# Patient Record
Sex: FEMALE | URBAN - METROPOLITAN AREA
[De-identification: names, ages, dates, MRNs, and addresses within clinical notes are randomized per-mention and may not be internally consistent; named-entity substitution may affect disease eponyms.]

---

## 2017-02-14 ENCOUNTER — OFFICE VISIT (OUTPATIENT)
Dept: INTERNAL MEDICINE CLINIC | Age: 37
End: 2017-02-14

## 2017-02-14 VITALS
BODY MASS INDEX: 20.98 KG/M2 | OXYGEN SATURATION: 98 % | TEMPERATURE: 99.1 F | DIASTOLIC BLOOD PRESSURE: 78 MMHG | WEIGHT: 138.4 LBS | HEIGHT: 68 IN | HEART RATE: 75 BPM | SYSTOLIC BLOOD PRESSURE: 108 MMHG | RESPIRATION RATE: 20 BRPM

## 2017-02-14 DIAGNOSIS — Z76.89 ENCOUNTER TO ESTABLISH CARE: Primary | ICD-10-CM

## 2017-02-14 DIAGNOSIS — F41.9 ANXIETY: ICD-10-CM

## 2017-02-14 RX ORDER — DUREZOL 0.5 MG/ML
EMULSION OPHTHALMIC
Refills: 3 | COMMUNITY
Start: 2017-01-27

## 2017-02-14 RX ORDER — TIMOLOL MALEATE 5 MG/ML
SOLUTION/ DROPS OPHTHALMIC
Refills: 0 | COMMUNITY
Start: 2017-01-09

## 2017-02-14 NOTE — PROGRESS NOTES
Chief Complaint   Patient presents with   [facility].     pateint is here to establish

## 2017-02-14 NOTE — PATIENT INSTRUCTIONS

## 2017-02-14 NOTE — PROGRESS NOTES
They are here to establish care with the group and me as a primary care provider. She is presently being treated for iritis by Dr. Phyllis Burks, [place]). This was diagnosed Jan 9th. She has no known cause for this. This has been present twice before. She is on timolol and durezol for this and has been improving. The plan is for the patient to continue the medication and gradually remove them. She is tapering this. She will see Dr. Corin Reyna next Friday. Otherwise she had recent evaluation with a  who felt her progesterone was low. She was placed on progesterone cream and also vitamins which have helped. She has a history of anxiety--    She sees a Gyn--Dr. Soren Guzmán in [place] (with Margarita)--no recent Pap. Has been 2 years. No previous abnormal    1 child. There are no active problems to display for this patient. Current Outpatient Prescriptions   Medication Sig Dispense Refill    timolol (TIMOPTIC) 0.5 % ophthalmic solution INSTILL 1 DROP INTO RIGHT EYE IN THE MORNING  0    DUREZOL 0.05 % ophthalmic emulsion INSTILL 1 DROP INTO RIGHT EYE EVERY 3 HRS  3     Not on File  No past medical history on file. No past surgical history on file. Family History   Problem Relation Age of Onset    Cancer Maternal Grandmother     Diabetes Maternal Grandfather     Cancer Maternal Grandfather     Cancer Paternal Grandmother     Cancer Paternal Grandfather     Cancer Father      Social History   Substance Use Topics    Smoking status: Never Smoker    Smokeless tobacco: Not on file    Alcohol use 1.2 oz/week     2 Shots of liquor per week          Review of Systems   Constitutional: Negative. Respiratory: Negative. Cardiovascular: Negative. Psychiatric/Behavioral: The patient is nervous/anxious.           Visit Vitals    /78 (BP 1 Location: Right arm, BP Patient Position: Sitting)    Pulse 75    Temp 99.1 °F (37.3 °C) (Oral)    Resp 20    Ht 5' 8.2\" (1.732 m)    Wt 138 lb 6.4 oz (62.8 kg)    LMP 02/12/2017    SpO2 98%    BMI 20.92 kg/m2       Physical Exam   Constitutional: She is well-developed, well-nourished, and in no distress. Cardiovascular: Normal rate, regular rhythm and normal heart sounds. Pulmonary/Chest: Effort normal and breath sounds normal.           ASSESSMENT/PLAN    Ascencion Saab was seen today for establish care. Diagnoses and all orders for this visit:    Encounter to establish care  -     CBC WITH AUTOMATED DIFF  -     LIPID PANEL  -     METABOLIC PANEL, COMPREHENSIVE  -     T4, FREE  -     TSH 3RD GENERATION  -     HEMOGLOBIN A1C WITH EAG    Anxiety      Follow-up Disposition:  Return in about 3 months (around 5/14/2017) for Full Physical - 30 minutes appointment.    -Discussed with the patient to continue the current plan of care. We will obtain baseline labwork and determine if any adjustments need to be done. We will also await the records of the previous PCP to ascertain further details of the patient's history. The patient agrees with and understands the plan of care. All questions have been answered.

## 2017-02-14 NOTE — MR AVS SNAPSHOT
Visit Information Date & Time Provider Department Dept. Phone Encounter #  
 2/14/2017  2:15 PM [address] [facility] [phone] [id number] Follow-up Instructions Return in about 3 months (around 5/14/2017) for Full Physical - 30 minutes appointment. Upcoming Health Maintenance Date Due DTaP/Tdap/Td series (1 - Tdap) 1/5/1999 PAP AKA CERVICAL CYTOLOGY 1/5/1999 Allergies as of 2/14/2017  Review Complete On: 2/14/2017 By: Puja Hobson MD  
 Not on File Current Immunizations  Never Reviewed No immunizations on file. Not reviewed this visit You Were Diagnosed With   
  
 Codes Comments Encounter to establish care    -  Primary ICD-10-CM: Z76.89 
ICD-9-CM: V65.8 Anxiety     ICD-10-CM: F41.9 ICD-9-CM: 300.00 Vitals BP Pulse Temp Resp Height(growth percentile) Weight(growth percentile) 108/78 (BP 1 Location: Right arm, BP Patient Position: Sitting) 75 99.1 °F (37.3 °C) (Oral) 20 5' 8.2\" (1.732 m) 138 lb 6.4 oz (62.8 kg) LMP SpO2 BMI OB Status Smoking Status 02/12/2017 98% 20.92 kg/m2 Having regular periods Never Smoker Vitals History BMI and BSA Data Body Mass Index Body Surface Area  
 20.92 kg/m 2 1.74 m 2 Preferred Pharmacy Pharmacy Name Phone [id number] [place] [phone] [facility] [phone] Your Updated Medication List  
  
   
This list is accurate as of: 2/14/17  2:59 PM.  Always use your most recent med list.  
  
  
  
  
 DUREZOL 0.05 % ophthalmic emulsion Generic drug:  Difluprednate INSTILL 1 DROP INTO RIGHT EYE EVERY 3 HRS  
  
 timolol 0.5 % ophthalmic solution Commonly known as:  TIMOPTIC INSTILL 1 DROP INTO RIGHT EYE IN THE MORNING We Performed the Following CBC WITH AUTOMATED DIFF [90966 CPT(R)] HEMOGLOBIN A1C WITH EAG [84479 CPT(R)] LIPID PANEL [80590 CPT(R)] METABOLIC PANEL, COMPREHENSIVE [91057 CPT(R)] T4, FREE [id number] CPT(R)] TSH 3RD GENERATION [63439 CPT(R)] Follow-up Instructions Return in about 3 months (around 5/14/2017) for Full Physical - 30 minutes appointment. Patient Instructions Learning About Anxiety Disorders What are anxiety disorders? Anxiety disorders are a type of medical problem. They cause severe anxiety. When you feel anxious, you feel that something bad is about to happen. This feeling interferes with your life. These disorders include: · Generalized anxiety disorder. You feel worried and stressed about many everyday events and activities. This goes on for several months and disrupts your life on most days. · Panic disorder. You have repeated panic attacks. A panic attack is a sudden, intense fear or anxiety. It may make you feel short of breath. Your heart may pound. · Social anxiety disorder. You feel very anxious about what you will say or do in front of people. For example, you may be scared to talk or eat in public. This problem affects your daily life. · Phobias. You are very scared of a specific object, situation, or activity. For example, you may fear spiders, high places, or small spaces. What are the symptoms? Generalized anxiety disorder Symptoms may include: · Feeling worried and stressed about many things almost every day. · Feeling tired or irritable. You may have a hard time concentrating. · Having headaches or muscle aches. · Having a hard time swallowing. · Feeling shaky, sweating, or having hot flashes. Panic disorder You may have repeated panic attacks when there is no reason for feeling afraid. You may change your daily activities because you worry that you will have another attack. Symptoms may include: 
· Intense fear, terror, or anxiety. · Trouble breathing or very fast breathing. · Chest pain or tightness. · A heartbeat that races or is not regular. Social anxiety disorder Symptoms may include: · Fear about a social situation, such as eating in front of others or speaking in public. You may worry a lot. Or you may be afraid that something bad will happen. · Anxiety that can cause you to blush, sweat, and feel shaky. · A heartbeat that is faster than normal. 
· A hard time focusing. Phobias Symptoms may include: · More fear than most people of being around an object, being in a situation, or doing an activity. You might also be stressed about the chance of being around the thing you fear. · Worry about losing control, panicking, fainting, or having physical symptoms like a faster heartbeat when you are around the situation or object. How are these disorders treated? Anxiety disorders can be treated with medicines or counseling. A combination of both may be used. Medicines may include: · Antidepressants. These may help your symptoms by keeping chemicals in your brain in balance. · Benzodiazepines. These may give you short-term relief of your symptoms. Some people use cognitive-behavioral therapy. A therapist helps you learn to change stressful or bad thoughts into helpful thoughts. Lead a healthy lifestyle A healthy lifestyle may help you feel better. · Get at least 30 minutes of exercise on most days of the week. Walking is a good choice. · Eat a healthy diet. Include fruits, vegetables, lean proteins, and whole grains in your diet each day. · Try to go to bed at the same time every night. Try for 8 hours of sleep a night. · Find ways to manage stress. Try relaxation exercises. · Avoid alcohol and illegal drugs. Follow-up care is a key part of your treatment and safety. Be sure to make and go to all appointments, and call your doctor if you are having problems. It's also a good idea to know your test results and keep a list of the medicines you take. Where can you learn more? Go to http://jd-ernestina.info/. Enter E229 in the search box to learn more about \"Learning About Anxiety Disorders. \" Current as of: July 26, 2016 Content Version: 11.1 © 0743-2620 [employer]. Care instructions adapted under license by [employer] (which disclaims liability or warranty for this information). If you have questions about a medical condition or this instruction, always ask your healthcare professional. [address] any warranty or liability for your use of this information. Introducing [facility]! [facility] introduces Cubikal patient portal. Now you can access parts of your medical record, email your doctor's office, and request medication refills online. 1. In your internet browser, go to [email or web address] 2. Click on the First Time User? Click Here link in the Sign In box. You will see the New Member Sign Up page. 3. Enter your Cubikal Access Code exactly as it appears below. You will not need to use this code after youve completed the sign-up process. If you do not sign up before the expiration date, you must request a new code. · Cubikal Access Code: [facility] and [facility] Expires: 5/15/2017  2:13 PM 
 
4. Enter the last four digits of your Social Security Number (xxxx) and Date of Birth (mm/dd/yyyy) as indicated and click Submit. You will be taken to the next sign-up page. 5. Create a Cubikal ID. This will be your Cubikal login ID and cannot be changed, so think of one that is secure and easy to remember. 6. Create a Cubikal password. You can change your password at any time. 7. Enter your Password Reset Question and Answer. This can be used at a later time if you forget your password. 8. Enter your e-mail address. You will receive e-mail notification when new information is available in [address]. 9. Click Sign Up. You can now view and download portions of your medical record. 10. Click the Download Summary menu link to download a portable copy of your medical information. If you have questions, please visit the Frequently Asked Questions section of the [employer] website. Remember, [employer] is NOT to be used for urgent needs. For medical emergencies, dial 911. Now available from your iPhone and Android! Please provide this summary of care documentation to your next provider. Your primary care clinician is listed as Enio Bob. If you have any questions after today's visit, please call [phone].

## 2017-05-16 ENCOUNTER — OFFICE VISIT (OUTPATIENT)
Dept: INTERNAL MEDICINE CLINIC | Age: 37
End: 2017-05-16

## 2017-05-16 VITALS
HEART RATE: 73 BPM | RESPIRATION RATE: 18 BRPM | OXYGEN SATURATION: 97 % | WEIGHT: 139.4 LBS | BODY MASS INDEX: 21.13 KG/M2 | SYSTOLIC BLOOD PRESSURE: 99 MMHG | HEIGHT: 68 IN | DIASTOLIC BLOOD PRESSURE: 70 MMHG | TEMPERATURE: 98.5 F

## 2017-05-16 DIAGNOSIS — Z00.00 WELL ADULT EXAM: Primary | ICD-10-CM

## 2017-05-16 DIAGNOSIS — Z83.3 FAMILY HISTORY OF DIABETES MELLITUS (DM): ICD-10-CM

## 2017-05-16 DIAGNOSIS — Z23 NEED FOR TDAP VACCINATION: ICD-10-CM

## 2017-05-16 DIAGNOSIS — H20.9 IRITIS: ICD-10-CM

## 2017-05-16 RX ORDER — LOTEPREDNOL ETABONATE 5 MG/G
GEL OPHTHALMIC
Refills: 0 | COMMUNITY
Start: 2017-05-02

## 2017-05-16 NOTE — PROGRESS NOTES
Comprehensive Physical Examination    Agusto Galloway is a 42 y.o. female. she presents for a comprehensive physical examination. She reports feeling well--no significant issues. She continues to follow up with optho for her eye dryness/irritation. Her optho is asking for rheum studies to help with her diagnosis. There are no active problems to display for this patient. Current Outpatient Prescriptions   Medication Sig Dispense Refill    LOTEMAX 0.5 % drpg PUT 1 DROP INTO RIGHT EYE 4 TIMES A DAY  0    timolol (TIMOPTIC) 0.5 % ophthalmic solution INSTILL 1 DROP INTO RIGHT EYE IN THE MORNING  0    DUREZOL 0.05 % ophthalmic emulsion INSTILL 1 DROP INTO RIGHT EYE EVERY 3 HRS  3     Not on File  No past medical history on file. No past surgical history on file. Family History   Problem Relation Age of Onset    Cancer Maternal Grandmother     Diabetes Maternal Grandfather     Cancer Maternal Grandfather     Cancer Paternal Grandmother     Cancer Paternal Grandfather     Cancer Father      Social History   Substance Use Topics    Smoking status: Never Smoker    Smokeless tobacco: Not on file    Alcohol use 1.2 oz/week     2 Shots of liquor per week        Health Maintenance   Topic Date Due    DTaP/Tdap/Td series (1 - Tdap) 01/05/1999    PAP AKA CERVICAL CYTOLOGY  01/05/1999    INFLUENZA AGE 9 TO ADULT  08/01/2017         Review of Systems   Constitutional: Negative. Respiratory: Negative. Cardiovascular: Negative. Visit Vitals    BP 99/70 (BP 1 Location: Right arm, BP Patient Position: Sitting)    Pulse 73    Temp 98.5 °F (36.9 °C) (Oral)    Resp 18    Ht 5' 8.11\" (1.73 m)    Wt 139 lb 6.4 oz (63.2 kg)    LMP 05/16/2017    SpO2 97%    BMI 21.13 kg/m2       Physical Exam   Constitutional: She is oriented to person, place, and time and well-developed, well-nourished, and in no distress.    HENT:   Mouth/Throat: Oropharynx is clear and moist.   Eyes: Pupils are equal, round, and reactive to light. Cardiovascular: Normal rate and regular rhythm. No murmur heard. Pulmonary/Chest: Effort normal and breath sounds normal.   Abdominal: Soft. Bowel sounds are normal.   Neurological: She is alert and oriented to person, place, and time. Skin: Skin is warm and dry. She is not diaphoretic. ASSESSMENT/PLAN  Aliya Bean was seen today for complete physical.    Diagnoses and all orders for this visit:    Well adult exam  -     CBC WITH AUTOMATED DIFF  -     METABOLIC PANEL, COMPREHENSIVE  -     TSH 3RD GENERATION  -     LIPID PANEL    Need for Tdap vaccination  -     TETANUS, DIPHTHERIA TOXOIDS AND ACELLULAR PERTUSSIS VACCINE (TDAP), IN INDIVIDS. >=7, IM    Iritis  -     QUANTIFERON TB GOLD  -     RPR  -     RHEUMATOID FACTOR, QL  -     LUIS EDUARDO BY MULTIPLEX FLOW IA, QL  -     SED RATE (ESR)  -     CRP, HIGH SENSITIVITY    Family history of diabetes mellitus (DM)  -     HEMOGLOBIN A1C WITH EAG    Other orders  -     QUANTIFERON IN TUBE REFL        Follow-up Disposition:  Return in about 6 months (around 11/16/2017) for Follow up.

## 2017-05-16 NOTE — PATIENT INSTRUCTIONS
Iritis: Care Instructions  Your Care Instructions    Iritis is an inflammation of the colored part of the eye. This part of the eye is called the iris. Iritis can cause redness and pain. It can make your eyes more sensitive to light. And it may make your pupils different sizes. Iritis is most often treated with prescription eyedrops. Treatment can usually prevent long-term problems with vision. Iritis usually lasts 6 to 8 weeks. You will need follow-up care with an eye doctor (ophthalmologist). Uveitis (say \"you-vee-EYE-tus\") and iridocyclitis (say \"ymr-pa-osi-barb-KLY-tus\") are other terms used to refer to this problem. Follow-up care is a key part of your treatment and safety. Be sure to make and go to all appointments, and call your doctor if you are having problems. It's also a good idea to know your test results and keep a list of the medicines you take. How can you care for yourself at home? · If the doctor gave you eyedrops, use them exactly as directed. Use the medicine for as long as instructed, even if your eye starts to look better soon. Call your doctor if you think you are having a problem with your eyedrops. Wash your hands well before and after you put in eyedrops. · To put in eyedrops or ointment:  ¨ Tilt your head back, and pull your lower eyelid down with one finger. ¨ Drop or squirt the medicine inside the lower lid. ¨ Close your eye for 30 to 60 seconds to let the drops or ointment move around. ¨ Do not touch the ointment or dropper tip to your eyelashes or any other surface. · Take an over-the-counter pain medicine, such as acetaminophen (Tylenol), ibuprofen (Advil, Motrin), or naproxen (Aleve). Read and follow all instructions on the label. · Make sure you go to all of your follow-up appointments. You will need a complete eye exam from an eye doctor. When should you call for help?   Call your doctor now or seek immediate medical care if:  · You have new or increasing eye pain.  · You have vision changes in either eye. Watch closely for changes in your health, and be sure to contact your doctor if:  · You have new or worse symptoms. · You do not get better as expected. Where can you learn more? Go to http://jd-ernestina.info/. Enter B112 in the search box to learn more about \"Iritis: Care Instructions. \"  Current as of: May 23, 2016  Content Version: 11.2  © 8947-7179 [employer]. Care instructions adapted under license by [employer] (which disclaims liability or warranty for this information). If you have questions about a medical condition or this instruction, always ask your healthcare professional. [address] any warranty or liability for your use of this information.

## 2017-05-16 NOTE — MR AVS SNAPSHOT
Visit Information Date & Time Provider Department Dept. Phone Encounter #  
 5/16/2017  1:30 PM [facility] [phone] [id number] Upcoming Health Maintenance Date Due DTaP/Tdap/Td series (1 - Tdap) 1/5/1999 PAP AKA CERVICAL CYTOLOGY 1/5/1999 INFLUENZA AGE 9 TO ADULT 8/1/2017 Allergies as of 5/16/2017  Review Complete On: 5/16/2017 By: Dylan Mcguire MD  
 Not on File Current Immunizations  Never Reviewed Name Date Tdap  Incomplete Not reviewed this visit You Were Diagnosed With   
  
 Codes Comments Well adult exam    -  Primary ICD-10-CM: Z00.00 ICD-9-CM: V70.0 Need for Tdap vaccination     ICD-10-CM: T35 ICD-9-CM: V06.1 Iritis     ICD-10-CM: H20.9 ICD-9-CM: 364.3 Family history of diabetes mellitus (DM)     ICD-10-CM: Z83.3 ICD-9-CM: V18.0 Vitals BP Pulse Temp Resp Height(growth percentile) Weight(growth percentile) 99/70 (BP 1 Location: Right arm, BP Patient Position: Sitting) 73 98.5 °F (36.9 °C) (Oral) 18 5' 8.11\" (1.73 m) 139 lb 6.4 oz (63.2 kg) LMP SpO2 BMI OB Status Smoking Status 05/16/2017 97% 21.13 kg/m2 Having regular periods Never Smoker Vitals History BMI and BSA Data Body Mass Index Body Surface Area  
 21.13 kg/m 2 1.74 m 2 Preferred Pharmacy Pharmacy Name Phone [id number] [place] [phone] [facility] [phone] Your Updated Medication List  
  
   
This list is accurate as of: 5/16/17  1:48 PM.  Always use your most recent med list.  
  
  
  
  
 DUREZOL 0.05 % ophthalmic emulsion Generic drug:  Difluprednate INSTILL 1 DROP INTO RIGHT EYE EVERY 3 HRS  
  
 LOTEMAX 0.5 % Drpg Generic drug:  loteprednol etabonate PUT 1 DROP INTO RIGHT EYE 4 TIMES A DAY  
  
 timolol 0.5 % ophthalmic solution Commonly known as:  TIMOPTIC INSTILL 1 DROP INTO RIGHT EYE IN THE MORNING  
  
  
  
  
 We Performed the Following LUIS EDUARDO BY MULTIPLEX FLOW IA, QL [49330 CPT(R)] CBC WITH AUTOMATED DIFF [13982 CPT(R)] CRP, HIGH SENSITIVITY [04403 CPT(R)] HEMOGLOBIN A1C WITH EAG [01531 CPT(R)] LIPID PANEL [16183 CPT(R)] METABOLIC PANEL, COMPREHENSIVE [51856 CPT(R)] QUANTIFERON TB GOLD [[id number] Custom] RHEUMATOID FACTOR, QL [id number] CPT(R)] RPR [61756 CPT(R)] SED RATE (ESR) [id number] CPT(R)] TETANUS, DIPHTHERIA TOXOIDS AND ACELLULAR PERTUSSIS VACCINE (TDAP), IN INDIVIDS. >=7, IM [id number] CPT(R)] TSH 3RD GENERATION [41826 CPT(R)] Patient Instructions Iritis: Care Instructions Your Care Instructions Iritis is an inflammation of the colored part of the eye. This part of the eye is called the iris. Iritis can cause redness and pain. It can make your eyes more sensitive to light. And it may make your pupils different sizes. Iritis is most often treated with prescription eyedrops. Treatment can usually prevent long-term problems with vision. Iritis usually lasts 6 to 8 weeks. You will need follow-up care with an eye doctor (ophthalmologist). Uveitis (say \"you-vee-EYE-tus\") and iridocyclitis (say \"lsy-mo-htl-barb-KLY-tus\") are other terms used to refer to this problem. Follow-up care is a key part of your treatment and safety. Be sure to make and go to all appointments, and call your doctor if you are having problems. It's also a good idea to know your test results and keep a list of the medicines you take. How can you care for yourself at home? · If the doctor gave you eyedrops, use them exactly as directed. Use the medicine for as long as instructed, even if your eye starts to look better soon. Call your doctor if you think you are having a problem with your eyedrops. Wash your hands well before and after you put in eyedrops. · To put in eyedrops or ointment: ¨ Tilt your head back, and pull your lower eyelid down with one finger. ¨ Drop or squirt the medicine inside the lower lid. ¨ Close your eye for 30 to 60 seconds to let the drops or ointment move around. ¨ Do not touch the ointment or dropper tip to your eyelashes or any other surface. · Take an over-the-counter pain medicine, such as acetaminophen (Tylenol), ibuprofen (Advil, Motrin), or naproxen (Aleve). Read and follow all instructions on the label. · Make sure you go to all of your follow-up appointments. You will need a complete eye exam from an eye doctor. When should you call for help? Call your doctor now or seek immediate medical care if: 
· You have new or increasing eye pain. · You have vision changes in either eye. Watch closely for changes in your health, and be sure to contact your doctor if: 
· You have new or worse symptoms. · You do not get better as expected. Where can you learn more? Go to http://jd-ernestina.info/. Enter B112 in the search box to learn more about \"Iritis: Care Instructions. \" Current as of: May 23, 2016 Content Version: 11.2 © 1189-5896 [employer]. Care instructions adapted under license by [employer] (which disclaims liability or warranty for this information). If you have questions about a medical condition or this instruction, always ask your healthcare professional. [address] any warranty or liability for your use of this information. Introducing [facility]! [employer] introduces [employer] patient portal. Now you can access parts of your medical record, email your doctor's office, and request medication refills online. 1. In your internet browser, go to [email or web address] 2. Click on the First Time User? Click Here link in the Sign In box. You will see the New Member Sign Up page. 3. Enter your [employer] Access Code exactly as it appears below. You will not need to use this code after youve completed the sign-up process.  If you do not sign up before the expiration date, you must request a new code. · [facility] Access Code: [email or web address] Expires: 8/14/2017 12:55 PM 
 
4. Enter the last four digits of your Social Security Number (xxxx) and Date of Birth (mm/dd/yyyy) as indicated and click Submit. You will be taken to the next sign-up page. 5. Create a [facility] ID. This will be your [facility] login ID and cannot be changed, so think of one that is secure and easy to remember. 6. Create a [facility] password. You can change your password at any time. 7. Enter your Password Reset Question and Answer. This can be used at a later time if you forget your password. 8. Enter your e-mail address. You will receive e-mail notification when new information is available in [address] 9. Click Sign Up. You can now view and download portions of your medical record. 10. Click the Download Summary menu link to download a portable copy of your medical information. If you have questions, please visit the Frequently Asked Questions section of the [facility] website. Remember, [facility] is NOT to be used for urgent needs. For medical emergencies, dial 911. Now available from your iPhone and Android! Please provide this summary of care documentation to your next provider. Your primary care clinician is listed as Nhi Bergman. If you have any questions after today's visit, please call [phone].

## 2017-05-19 LAB
ALBUMIN SERPL-MCNC: 4 G/DL (ref 3.5–5.5)
ALBUMIN/GLOB SERPL: 1.5 {RATIO} (ref 1.2–2.2)
ALP SERPL-CCNC: 60 IU/L (ref 39–117)
ALT SERPL-CCNC: 16 IU/L (ref 0–32)
ANA SER QL: NEGATIVE
AST SERPL-CCNC: 24 IU/L (ref 0–40)
BASOPHILS # BLD AUTO: 0 X10E3/UL (ref 0–0.2)
BASOPHILS NFR BLD AUTO: 0 %
BILIRUB SERPL-MCNC: 0.4 MG/DL (ref 0–1.2)
BUN SERPL-MCNC: 11 MG/DL (ref 6–20)
BUN/CREAT SERPL: 13 (ref 9–23)
CALCIUM SERPL-MCNC: 9.1 MG/DL (ref 8.7–10.2)
CHLORIDE SERPL-SCNC: 103 MMOL/L (ref 96–106)
CHOLEST SERPL-MCNC: 186 MG/DL (ref 100–199)
CO2 SERPL-SCNC: 26 MMOL/L (ref 18–29)
CREAT SERPL-MCNC: 0.84 MG/DL (ref 0.57–1)
CRP SERPL HS-MCNC: 1.46 MG/L (ref 0–3)
EOSINOPHIL # BLD AUTO: 0.1 X10E3/UL (ref 0–0.4)
EOSINOPHIL NFR BLD AUTO: 1 %
ERYTHROCYTE [DISTWIDTH] IN BLOOD BY AUTOMATED COUNT: 13.3 % (ref 12.3–15.4)
ERYTHROCYTE [SEDIMENTATION RATE] IN BLOOD BY WESTERGREN METHOD: 3 MM/HR (ref 0–32)
EST. AVERAGE GLUCOSE BLD GHB EST-MCNC: 114 MG/DL
GLOBULIN SER CALC-MCNC: 2.6 G/DL (ref 1.5–4.5)
GLUCOSE SERPL-MCNC: 93 MG/DL (ref 65–99)
HBA1C MFR BLD: 5.6 % (ref 4.8–5.6)
HCT VFR BLD AUTO: 38.5 % (ref 34–46.6)
HDLC SERPL-MCNC: 78 MG/DL
HGB BLD-MCNC: 12.8 G/DL (ref 11.1–15.9)
IMM GRANULOCYTES # BLD: 0 X10E3/UL (ref 0–0.1)
IMM GRANULOCYTES NFR BLD: 0 %
LDLC SERPL CALC-MCNC: 101 MG/DL (ref 0–99)
LYMPHOCYTES # BLD AUTO: 1.5 X10E3/UL (ref 0.7–3.1)
LYMPHOCYTES NFR BLD AUTO: 35 %
MCH RBC QN AUTO: 31 PG (ref 26.6–33)
MCHC RBC AUTO-ENTMCNC: 33.2 G/DL (ref 31.5–35.7)
MCV RBC AUTO: 93 FL (ref 79–97)
MONOCYTES # BLD AUTO: 0.3 X10E3/UL (ref 0.1–0.9)
MONOCYTES NFR BLD AUTO: 8 %
NEUTROPHILS # BLD AUTO: 2.4 X10E3/UL (ref 1.4–7)
NEUTROPHILS NFR BLD AUTO: 56 %
PLATELET # BLD AUTO: 188 X10E3/UL (ref 150–379)
POTASSIUM SERPL-SCNC: 3.9 MMOL/L (ref 3.5–5.2)
PROT SERPL-MCNC: 6.6 G/DL (ref 6–8.5)
RBC # BLD AUTO: 4.13 X10E6/UL (ref 3.77–5.28)
RHEUMATOID FACT SERPL-ACNC: <10 IU/ML (ref 0–13.9)
RPR SER QL: NON REACTIVE
SODIUM SERPL-SCNC: 142 MMOL/L (ref 134–144)
TRIGL SERPL-MCNC: 33 MG/DL (ref 0–149)
TSH SERPL DL<=0.005 MIU/L-ACNC: 1.19 UIU/ML (ref 0.45–4.5)
VLDLC SERPL CALC-MCNC: 7 MG/DL (ref 5–40)
WBC # BLD AUTO: 4.3 X10E3/UL (ref 3.4–10.8)

## 2017-05-22 LAB
ANNOTATION COMMENT IMP: NORMAL
GAMMA INTERFERON BACKGROUND BLD IA-ACNC: 0.07 IU/ML
M TB IFN-G BLD-IMP: NEGATIVE
M TB IFN-G CD4+ BCKGRND COR BLD-ACNC: <0 IU/ML
M TB IFN-G CD4+ T-CELLS BLD-ACNC: 0.05 IU/ML
MITOGEN IGNF BLD-ACNC: >10 IU/ML
QUANTIFERON INCUBATION: NORMAL
SERVICE CMNT-IMP: NORMAL

## 2017-10-24 ENCOUNTER — OFFICE VISIT (OUTPATIENT)
Dept: INTERNAL MEDICINE CLINIC | Age: 37
End: 2017-10-24

## 2017-10-24 VITALS
WEIGHT: 137 LBS | SYSTOLIC BLOOD PRESSURE: 100 MMHG | BODY MASS INDEX: 20.76 KG/M2 | DIASTOLIC BLOOD PRESSURE: 80 MMHG | RESPIRATION RATE: 20 BRPM | HEIGHT: 68 IN | TEMPERATURE: 98.3 F | OXYGEN SATURATION: 95 % | HEART RATE: 91 BPM

## 2017-10-24 DIAGNOSIS — H18.891 CORNEAL IRRITATION OF RIGHT EYE: Primary | ICD-10-CM

## 2017-10-24 DIAGNOSIS — H20.9 IRITIS OF RIGHT EYE: ICD-10-CM

## 2017-10-24 DIAGNOSIS — R80.9 PROTEINURIA, UNSPECIFIED TYPE: ICD-10-CM

## 2017-10-24 RX ORDER — GANCICLOVIR 1.5 MG/G
GEL OPHTHALMIC
Refills: 1 | COMMUNITY
Start: 2017-10-02

## 2017-10-24 RX ORDER — VALACYCLOVIR HYDROCHLORIDE 1 G/1
TABLET, FILM COATED ORAL
Refills: 0 | COMMUNITY
Start: 2017-10-09

## 2017-10-24 NOTE — PROGRESS NOTES
Follow Up Visit    Dawood Zurita is a 42 y.o. female. she presents for Bladder Infection (follow up ) and Eye Problem    She has been seen by a optho for several months and has been seen by Dr. Sarahi Velarde in [place]. This is due to ongoing corneal issues. She has also been sent to a corneal specialist to have this further evaluated. She has been on prednisone and has had some improvement but not she has not had a total resolution as of yet. Dr. Sarahi Velarde is requesting labs for this. She also has had a UTI a week ago. The NP at the [facility] was concerned about some protein in the urine. She has been treated and feels well today. No complaints. Patient Active Problem List   Diagnosis Code    Corneal irritation of right eye H18.891    Iritis of right eye H20.9         Prior to Admission medications    Medication Sig Start Date End Date Taking?  Authorizing Provider   valACYclovir (VALTREX) 1 gram tablet TAKE 1 TABLET EVERY DAY 10/9/17  Yes Historical Provider   ZIRGAN 0.15 % gel INSTILL 1 DROP 5 TIMES A DAY INTO RIGHT EYE 10/2/17   Historical Provider   LOTEMAX 0.5 % drpg PUT 1 DROP INTO RIGHT EYE 4 TIMES A DAY 5/2/17   Historical Provider   timolol (TIMOPTIC) 0.5 % ophthalmic solution INSTILL 1 DROP INTO RIGHT EYE IN THE MORNING 1/9/17   Historical Provider   DUREZOL 0.05 % ophthalmic emulsion INSTILL 1 DROP INTO RIGHT EYE EVERY 3 HRS 1/27/17   Historical Provider         Health Maintenance   Topic Date Due    PAP AKA CERVICAL CYTOLOGY  01/05/1999    INFLUENZA AGE 9 TO ADULT  08/01/2017    DTaP/Tdap/Td series (2 - Td) 05/16/2027       Review of Systems   Eyes:        Eye irritation           Visit Vitals    /80 (BP 1 Location: Right arm, BP Patient Position: Sitting)    Pulse 91    Temp 98.3 °F (36.8 °C) (Oral)    Resp 20    Ht 5' 8\" (1.727 m)    Wt 137 lb (62.1 kg)    LMP 09/30/2017    SpO2 95%    BMI 20.83 kg/m2       Physical Exam   Constitutional: She is oriented to person, place, and time. She appears well-developed. No distress. Eyes:   Irritated right eye, corneal cover overlying. Mild erythema. Neurological: She is alert and oriented to person, place, and time. ASSESSMENT/PLAN    Diagnoses and all orders for this visit:    1. Corneal irritation of right eye    2. Iritis of right eye  -     T PALLIDUM AB, BY FTA-ABS  -     LYME AB/WESTERN BLOT REFLEX (Do not use for [place])  -     HLA-B27  -     CMV AB, IGG  -     CMV AB, IGM    3. Proteinuria, unspecified type  -     UA/M W/RFLX CULTURE, ROUTINE        Follow-up Disposition:  Return if symptoms worsen or fail to improve, for Follow up.

## 2017-11-15 LAB
APPEARANCE UR: ABNORMAL
B BURGDOR IGG+IGM SER-ACNC: <0.91 ISR (ref 0–0.9)
B BURGDOR IGM SER IA-ACNC: <0.8 INDEX (ref 0–0.79)
BACTERIA #/AREA URNS HPF: NORMAL /[HPF]
BILIRUB UR QL STRIP: NEGATIVE
CASTS URNS QL MICRO: NORMAL /LPF
CMV IGG SERPL IA-ACNC: <0.6 U/ML (ref 0–0.59)
CMV IGM SERPL IA-ACNC: <30 AU/ML (ref 0–29.9)
COLOR UR: YELLOW
EPI CELLS #/AREA URNS HPF: NORMAL /HPF
GLUCOSE UR QL: NEGATIVE
HGB UR QL STRIP: NEGATIVE
HLA-B27 QL NAA+PROBE: NEGATIVE
KETONES UR QL STRIP: NEGATIVE
LEUKOCYTE ESTERASE UR QL STRIP: NEGATIVE
MICRO URNS: ABNORMAL
MICRO URNS: ABNORMAL
MUCOUS THREADS URNS QL MICRO: PRESENT
NITRITE UR QL STRIP: NEGATIVE
PH UR STRIP: 6 [PH] (ref 5–7.5)
PROT UR QL STRIP: ABNORMAL
RBC #/AREA URNS HPF: NORMAL /HPF
SP GR UR: >=1.03 (ref 1–1.03)
T PALLIDUM AB SER QL IF: NON REACTIVE
URINALYSIS REFLEX, 377202: ABNORMAL
UROBILINOGEN UR STRIP-MCNC: 1 MG/DL (ref 0.2–1)
WBC #/AREA URNS HPF: NORMAL /HPF

## 2022-03-18 PROBLEM — H18.891 CORNEAL IRRITATION OF RIGHT EYE: Status: ACTIVE | Noted: 2017-10-24

## 2022-03-19 PROBLEM — H20.9 IRITIS OF RIGHT EYE: Status: ACTIVE | Noted: 2017-10-24

## 2023-05-24 RX ORDER — DIFLUPREDNATE OPHTHALMIC 0.5 MG/ML
EMULSION OPHTHALMIC
COMMUNITY
Start: 2017-01-27

## 2023-05-24 RX ORDER — VALACYCLOVIR HYDROCHLORIDE 1 G/1
1 TABLET, FILM COATED ORAL DAILY
COMMUNITY
Start: 2017-10-09

## 2023-05-24 RX ORDER — GANCICLOVIR 1.5 MG/G
GEL OPHTHALMIC
COMMUNITY
Start: 2017-10-02

## 2023-05-24 RX ORDER — TIMOLOL MALEATE 5 MG/ML
SOLUTION/ DROPS OPHTHALMIC
COMMUNITY
Start: 2017-01-09

## 2023-05-24 RX ORDER — LOTEPREDNOL ETABONATE 5 MG/G
GEL OPHTHALMIC
COMMUNITY
Start: 2017-05-02